# Patient Record
Sex: FEMALE | Race: WHITE | NOT HISPANIC OR LATINO | Employment: STUDENT | URBAN - METROPOLITAN AREA
[De-identification: names, ages, dates, MRNs, and addresses within clinical notes are randomized per-mention and may not be internally consistent; named-entity substitution may affect disease eponyms.]

---
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
## 2019-05-23 PROBLEM — R29.898 LEFT ARM WEAKNESS: Status: ACTIVE | Noted: 2019-05-23

## 2019-06-03 ENCOUNTER — OFFICE VISIT (OUTPATIENT)
Dept: ORTHOPEDICS | Facility: CLINIC | Age: 19
End: 2019-06-03
Payer: COMMERCIAL

## 2019-06-03 VITALS — BODY MASS INDEX: 18.71 KG/M2 | HEIGHT: 68 IN | WEIGHT: 123.44 LBS

## 2019-06-03 DIAGNOSIS — M41.44 NEUROMUSCULAR SCOLIOSIS, THORACIC REGION: ICD-10-CM

## 2019-06-03 PROCEDURE — 3008F BODY MASS INDEX DOCD: CPT | Mod: ,,, | Performed by: ORTHOPAEDIC SURGERY

## 2019-06-03 PROCEDURE — 99213 OFFICE O/P EST LOW 20 MIN: CPT | Mod: ,,, | Performed by: ORTHOPAEDIC SURGERY

## 2019-06-03 PROCEDURE — 99213 PR OFFICE/OUTPT VISIT, EST, LEVL III, 20-29 MIN: ICD-10-PCS | Mod: ,,, | Performed by: ORTHOPAEDIC SURGERY

## 2019-06-03 PROCEDURE — 3008F PR BODY MASS INDEX (BMI) DOCUMENTED: ICD-10-PCS | Mod: ,,, | Performed by: ORTHOPAEDIC SURGERY

## 2019-06-03 NOTE — PROGRESS NOTES
Alida Herman is a 19 y.o. female that is here for follow up of scoliosis. 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 She has been fully active. Denies any pain, bowel or bladder changes or radicular type symptoms. They have not noticed any changes in her appearance since the last visit. Pain rating 0.  She is school at [employer] and playing club tennis                  (Not in a hospital admission)    Review of Symptoms: Review of Symptoms:Review of Systems   Constitution: Negative for fever and weight loss.   HENT: Negative for congestion.    Eyes: Negative.  Negative for blurred vision.   Cardiovascular: Negative for chest pain.   Respiratory: Negative for cough.    Skin: Negative for rash.   Musculoskeletal: Negative for joint pain.   Gastrointestinal: Negative for abdominal pain.   Genitourinary: Negative for bladder incontinence.   Neurological: Negative for focal weakness.     Active Ambulatory Problems     Diagnosis Date Noted    Adolescent idiopathic scoliosis of thoracic region 08/01/2017    Left arm weakness 05/23/2019     Resolved Ambulatory Problems     Diagnosis Date Noted    No Resolved Ambulatory Problems     No Additional Past Medical History       Physical Exam    Patient alert and oriented  No obvious deformities of face, head or neck.    All extremities pink and warm with good cap refill and no edema.   No skin lesions face back or extremities   Gait normal.  Neuro exam normal 2+ DTR abdominal, patellar and achilles.    Motor exam upper and lower extremities intact  Back shows full rom.  Rotation and deformity moderate left upper thoracic    Xrays  Xrays were done today and my read is a 40 degree left upper thoracic curve T1-6. Skeletally mature.      Impresion   Scoliosis moderate thoracic still stable.  Plan to follow up in 2 years with new PA spine xray.      Plan  she has a significant curve but is  doing well.  Just won a tennis championship.  Curve has been stable.  Starting school at [employer] Next week. Plan is to follow up 2 years with new xrays. Earlier if they notice changes.

## 2019-06-10 PROBLEM — M41.44: Status: ACTIVE | Noted: 2019-06-10

## 2021-06-23 ENCOUNTER — TELEPHONE (OUTPATIENT)
Dept: ORTHOPEDICS | Facility: CLINIC | Age: 21
End: 2021-06-23

## 2021-08-03 ENCOUNTER — OFFICE VISIT (OUTPATIENT)
Dept: ORTHOPEDICS | Facility: CLINIC | Age: 21
End: 2021-08-03
Payer: COMMERCIAL

## 2021-08-03 DIAGNOSIS — M41.44 NEUROMUSCULAR SCOLIOSIS, THORACIC REGION: Primary | ICD-10-CM

## 2021-08-03 PROCEDURE — 99213 OFFICE O/P EST LOW 20 MIN: CPT | Mod: 95,,, | Performed by: ORTHOPAEDIC SURGERY

## 2021-08-03 PROCEDURE — 99213 PR OFFICE/OUTPT VISIT, EST, LEVL III, 20-29 MIN: ICD-10-PCS | Mod: 95,,, | Performed by: ORTHOPAEDIC SURGERY

## 2023-07-13 ENCOUNTER — TELEPHONE (OUTPATIENT)
Dept: ORTHOPEDICS | Facility: CLINIC | Age: 23
End: 2023-07-13
Payer: COMMERCIAL

## 2023-07-13 NOTE — TELEPHONE ENCOUNTER
----- Message from Jenise Luz sent at 7/13/2023 12:39 PM CDT -----  Regarding: Appt Access  Contact: pt [phone]  Pt is calling to schedule follow up visit in [place]. Pls call

## 2023-07-25 ENCOUNTER — TELEPHONE (OUTPATIENT)
Dept: ORTHOPEDICS | Facility: CLINIC | Age: 23
End: 2023-07-25
Payer: COMMERCIAL

## 2023-07-25 NOTE — TELEPHONE ENCOUNTER
----- Message from Socorro Allen sent at 7/25/2023 11:22 AM CDT -----  Regarding: appt access  Contact: [phone]  Alida Herman calling regarding Appointment Access  (message) for #f/u for spine. She states she reached out a while ago and no one have reached out. Please call

## 2023-09-26 DIAGNOSIS — M41.124 ADOLESCENT IDIOPATHIC SCOLIOSIS OF THORACIC REGION: Primary | ICD-10-CM

## 2023-10-02 ENCOUNTER — OFFICE VISIT (OUTPATIENT)
Dept: ORTHOPEDICS | Facility: CLINIC | Age: 23
End: 2023-10-02
Payer: COMMERCIAL

## 2023-10-02 VITALS — BODY MASS INDEX: 18.54 KG/M2 | WEIGHT: 115.38 LBS | HEIGHT: 66 IN

## 2023-10-02 DIAGNOSIS — M41.44 NEUROMUSCULAR SCOLIOSIS, THORACIC REGION: Primary | ICD-10-CM

## 2023-10-02 PROCEDURE — 1159F MED LIST DOCD IN RCRD: CPT | Mod: S$GLB,,, | Performed by: ORTHOPAEDIC SURGERY

## 2023-10-02 PROCEDURE — 3008F PR BODY MASS INDEX (BMI) DOCUMENTED: ICD-10-PCS | Mod: S$GLB,,, | Performed by: ORTHOPAEDIC SURGERY

## 2023-10-02 PROCEDURE — 1159F PR MEDICATION LIST DOCUMENTED IN MEDICAL RECORD: ICD-10-PCS | Mod: S$GLB,,, | Performed by: ORTHOPAEDIC SURGERY

## 2023-10-02 PROCEDURE — 99213 OFFICE O/P EST LOW 20 MIN: CPT | Mod: S$GLB,,, | Performed by: ORTHOPAEDIC SURGERY

## 2023-10-02 PROCEDURE — 3008F BODY MASS INDEX DOCD: CPT | Mod: S$GLB,,, | Performed by: ORTHOPAEDIC SURGERY

## 2023-10-02 PROCEDURE — 99213 PR OFFICE/OUTPT VISIT, EST, LEVL III, 20-29 MIN: ICD-10-PCS | Mod: S$GLB,,, | Performed by: ORTHOPAEDIC SURGERY

## 2023-10-02 NOTE — PROGRESS NOTES
Alida Herman is a 23 y.o. female that is here for follow up of scoliosis. She has scoliosis that is related to her polio-like syndrome post [place] virus. She is currently being treated with obsevation. She has been fully active. Denies any pain, bowel or bladder changes or radicular type symptoms. They have not noticed any changes in her appearance since the last visit. Pain rating 0.      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    All extremities pink and warm with good cap refill and no edema.   No skin lesions face or back  Gait normal.  Back shows full rom.  Rotation and deformity moderate left upper thoracic    Xrays  Xrays were done today and my read is a 34 degree left upper thoracic curve T1-6 22. Skeletally mature.      Impresion   Scoliosis moderate thoracic still stable.      Plan  she has a significant curve but is doing well. She has been stable.  Follow up as needed.